# Patient Record
Sex: FEMALE | Race: BLACK OR AFRICAN AMERICAN | Employment: FULL TIME | URBAN - METROPOLITAN AREA
[De-identification: names, ages, dates, MRNs, and addresses within clinical notes are randomized per-mention and may not be internally consistent; named-entity substitution may affect disease eponyms.]

---

## 2018-03-01 ENCOUNTER — SEE NOTE (OUTPATIENT)
Dept: URBAN - METROPOLITAN AREA CLINIC 32 | Facility: CLINIC | Age: 33
Setting detail: DERMATOLOGY
End: 2018-03-01

## 2018-03-01 PROBLEM — R21 RASH AND OTHER NONSPECIFIC SKIN ERUPTION: Status: RESOLVED | Noted: 2018-03-01

## 2018-03-01 PROCEDURE — 99202 OFFICE O/P NEW SF 15 MIN: CPT

## 2018-03-01 RX ORDER — TRIAMCINOLONE ACETONIDE 1 MG/G
1 APPLICATION OINTMENT TOPICAL BID
Qty: 30 | Refills: 1
Start: 2018-03-01

## 2018-03-22 ENCOUNTER — FOLLOW UP (OUTPATIENT)
Dept: URBAN - METROPOLITAN AREA CLINIC 32 | Facility: CLINIC | Age: 33
Setting detail: DERMATOLOGY
End: 2018-03-22

## 2018-03-22 PROBLEM — R21 RASH AND OTHER NONSPECIFIC SKIN ERUPTION: Status: RESOLVED | Noted: 2018-03-22

## 2018-03-22 PROCEDURE — 99213 OFFICE O/P EST LOW 20 MIN: CPT

## 2021-04-15 ENCOUNTER — OFFICE VISIT (OUTPATIENT)
Dept: URBAN - METROPOLITAN AREA CLINIC 12 | Facility: CLINIC | Age: 36
End: 2021-04-15

## 2021-04-19 ENCOUNTER — OFFICE VISIT (OUTPATIENT)
Dept: URBAN - METROPOLITAN AREA CLINIC 23 | Facility: CLINIC | Age: 36
End: 2021-04-19
Payer: COMMERCIAL

## 2021-04-19 DIAGNOSIS — R14.0 BLOATING: ICD-10-CM

## 2021-04-19 DIAGNOSIS — K59.09 CHRONIC CONSTIPATION: ICD-10-CM

## 2021-04-19 PROCEDURE — 1036F TOBACCO NON-USER: CPT | Performed by: INTERNAL MEDICINE

## 2021-04-19 PROCEDURE — G9903 PT SCRN TBCO ID AS NON USER: HCPCS | Performed by: INTERNAL MEDICINE

## 2021-04-19 PROCEDURE — G8427 DOCREV CUR MEDS BY ELIG CLIN: HCPCS | Performed by: INTERNAL MEDICINE

## 2021-04-19 PROCEDURE — G9622 NO UNHEAL ETOH USER: HCPCS | Performed by: INTERNAL MEDICINE

## 2021-04-19 PROCEDURE — G8417 CALC BMI ABV UP PARAM F/U: HCPCS | Performed by: INTERNAL MEDICINE

## 2021-04-19 PROCEDURE — 99244 OFF/OP CNSLTJ NEW/EST MOD 40: CPT | Performed by: INTERNAL MEDICINE

## 2021-04-19 PROCEDURE — 3017F COLORECTAL CA SCREEN DOC REV: CPT | Performed by: INTERNAL MEDICINE

## 2021-04-19 RX ORDER — MELOXICAM 15 MG/1
1 TABLET TABLET ORAL ONCE A DAY
Status: ACTIVE | COMMUNITY

## 2021-04-19 RX ORDER — DAPAGLIFLOZIN 10 MG/1
1 TABLET TABLET, FILM COATED ORAL ONCE A DAY
Status: ACTIVE | COMMUNITY

## 2021-04-19 NOTE — HPI-TODAY'S VISIT:
This patient was referred by Dr. Hernan Hendricks for evaluation of abdominal bloating. The copy of this note will be sent to the referring provider. 36-year-old  [place] female presents for bloating, increasing abdominal girth worsening over the past 3 months.  She also reports deformed abdominal shape, right-sided abdomen is bulging when she sits.  History of ventral hernia.  Weight gain of 20 pounds this year.  No abdominal pain.  1 bowel movement daily or every other day.  Straining most of the time.  Mayes Stool Scale type I-III.  No blood in stool.  Denies nausea and vomiting.  No family history of colon cancer or IBD.  She has diabetes.  She takes iron pills to boost energy.

## 2021-04-19 NOTE — PREVIOUS WORKUP REVIEWED
.  ENDOSCOPIES   LABS   IMAGES -Transvaginal ultrasound 3/23/2021: Normal ovaries.  1.5 cm calcified fibroid.

## 2021-04-20 LAB
A/G RATIO: 1.6
ALBUMIN: 3.9
ALKALINE PHOSPHATASE: 64
ALT (SGPT): 9
AST (SGOT): 24
BILIRUBIN, TOTAL: 0.2
BUN/CREATININE RATIO: 13
BUN: 11
C-REACTIVE PROTEIN, QUANT: 2
CALCIUM: 9.1
CARBON DIOXIDE, TOTAL: 21
CHLORIDE: 107
CREATININE: 0.85
EGFR IF AFRICN AM: 101
EGFR IF NONAFRICN AM: 88
GLOBULIN, TOTAL: 2.5
GLUCOSE: 165
HEMATOCRIT: 36.9
HEMOGLOBIN: 12.1
MCH: 29.2
MCHC: 32.8
MCV: 89
NRBC: (no result)
PLATELETS: 332
POTASSIUM: 4.1
PROTEIN, TOTAL: 6.4
RBC: 4.14
RDW: 13.1
SEDIMENTATION RATE-WESTERGREN: 9
SODIUM: 140
WBC: 7.1

## 2021-05-17 ENCOUNTER — OFFICE VISIT (OUTPATIENT)
Dept: URBAN - METROPOLITAN AREA CLINIC 23 | Facility: CLINIC | Age: 36
End: 2021-05-17

## 2021-05-18 ENCOUNTER — OFFICE VISIT (OUTPATIENT)
Dept: URBAN - METROPOLITAN AREA CLINIC 23 | Facility: CLINIC | Age: 36
End: 2021-05-18

## 2021-05-20 ENCOUNTER — OFFICE VISIT (OUTPATIENT)
Dept: URBAN - METROPOLITAN AREA CLINIC 12 | Facility: CLINIC | Age: 36
End: 2021-05-20

## 2021-05-31 ENCOUNTER — DASHBOARD ENCOUNTERS (OUTPATIENT)
Age: 36
End: 2021-05-31